# Patient Record
(demographics unavailable — no encounter records)

---

## 2025-03-25 NOTE — HISTORY OF PRESENT ILLNESS
[FreeTextEntry1] : Patient here for establish care. Experiencing thyroid issues. Has persistent neck pain, high fever  [de-identified] : Mr. CHERELLE MONTOYA is a 32 year old male here today for swelling of the thyroid. weight loss, pain in the neck, sweating and increased heart rate for the 2 months  TFTs show high free T4 and low TSH. Has been taking ibuprofen for neck pain.  Had the flu in late January and shortly thereafter he developed pain and swelling in his neck.  Otherwise he is healthy.

## 2025-03-25 NOTE — HEALTH RISK ASSESSMENT
[Poor] : ~his/her~ mood as  poor [No] : No [No falls in past year] : Patient reported no falls in the past year [0] : 2) Feeling down, depressed, or hopeless: Not at all (0) [With Family] : lives with family [Employed] : employed [] :  [Sexually Active] : sexually active [Feels Safe at Home] : Feels safe at home [Current] : Current [NO] : No [Change in mental status noted] : No change in mental status noted [Reports changes in hearing] : Reports no changes in hearing [Reports changes in vision] : Reports no changes in vision [Reports changes in dental health] : Reports no changes in dental health [de-identified] : cigars once a week

## 2025-03-25 NOTE — PHYSICAL EXAM
[No Acute Distress] : no acute distress [Well Nourished] : well nourished [Clear to Auscultation] : lungs were clear to auscultation bilaterally [Regular Rhythm] : with a regular rhythm [No Murmur] : no murmur heard [No Edema] : there was no peripheral edema [Normal] : affect was normal and insight and judgment were intact [de-identified] : thryoid is enlarged and tender to touch [de-identified] : mildly tachrcardic [de-identified] : jennifer

## 2025-03-25 NOTE — REVIEW OF SYSTEMS
[Palpitations] : palpitations [Negative] : Genitourinary [FreeTextEntry2] : weight loss [FreeTextEntry4] : neck pain with thyroid swelling [FreeTextEntry1] : sweating

## 2025-03-27 NOTE — HISTORY OF PRESENT ILLNESS
[FreeTextEntry1] : Mar 26, 2025   accompanied by his wife  PCP:  Dr. Cedrick Pepper  CC:  Hyperthyroid  3/18/2025   TSH 0.019   fT4 3.63 (0.75-2.0)    31 yo, works with [employer],  developed painful swelling in region of R thyroid  lobe starting a few weeks ago.    Treated with antibiotics at [facility].    Dr. Pepper found thyroid enlarged, firm, tender and lab tests show hyperthyroidism.    : : Constitutional:  Alert, well nourished, healthy appearance, no acute distress  Eyes:  No proptosis, no stare Thyroid: Diffusely enlarged, firm ("woody") tender Pulmonary:  No respiratory distress, no accessory muscle use; normal rate and effort Cardiac:  Normal rate Vascular:  Endocrine:  No stigmata of Cushings Syndrome Musculoskeletal:  Normal gait, no involuntary movements Neurology:  No tremors Affect/Mood/Psych:  Oriented x 3; normal affect, normal insight/judgement, normal mood  .  Impression:   Careful evaluation by Dr. Pepper consistent with subacute thyroiditis.  Plan:  US thyroid pending. Updated TFTs at [place] shortly prior to visit here in 3 weeks. Primary symptoms is pain, treated with ibuprofen and Tylenol.  He is interested in prednisone. Will provide 10 mg tabs, starting with BID for 3 days; 15 mg x 3 days; 10 mg x 3 days;   5 mg x 3 days and then discontinue. Typical trajectory and other issues in setting of subacute thyroid itis reviewed.

## 2025-03-27 NOTE — HISTORY OF PRESENT ILLNESS
[FreeTextEntry1] : Mar 26, 2025   accompanied by his wife  PCP:  Dr. Cedrick Pepper  CC:  Hyperthyroid  3/18/2025   TSH 0.019   fT4 3.63 (0.75-2.0)    31 yo, works with [employer],  developed painful swelling in region of R thyroid  lobe starting a few weeks ago.    Treated with antibiotics at [facility].    Dr. Pepper found thyroid enlarged, firm, tender and lab tests show hyperthyroidism.    : : Constitutional:  Alert, well nourished, healthy appearance, no acute distress  Eyes:  No proptosis, no stare Thyroid: Diffusely enlarged, firm ("woody") tender Pulmonary:  No respiratory distress, no accessory muscle use; normal rate and effort Cardiac:  Normal rate Vascular:  Endocrine:  No stigmata of Cushings Syndrome Musculoskeletal:  Normal gait, no involuntary movements Neurology:  No tremors Affect/Mood/Psych:  Oriented x 3; normal affect, normal insight/judgement, normal mood  .  Impression:   Careful evaluation by Dr. Pepper consistent with subacute thyroiditis.  Plan:  US thyroid pending. Updated TFTs at [place] shortly prior to visit here in 3 weeks. Primary symptoms is pain, treated with ibuprofen and Tylenol.  He is interested in prednisone. Will provide 10 mg tabs, starting with BID for 3 days; 15 mg x 3 days; 10 mg x 3 days;   5 mg x 3 days and then discontinue. Typical trajectory and other issues in setting of subacute thyroid itis reviewed.

## 2025-04-25 NOTE — HISTORY OF PRESENT ILLNESS
[FreeTextEntry1] : Apr 22, 2025    in person  PCP:  Dr. Cedrick Pepper  CC:  Hyperthyroid  3/18/2025   TSH 0.019   fT4 3.63 (0.75-2.0)    T3 486 ***                                54/19/25   TSH < 0.15 **  T4 7.4;  T3 117    31 yo, works with [employer],  developed painful swelling in region of R thyroid  lobe starting a few weeks ago.    Treated with antibiotics at [facility].    Dr. Pepper found thyroid enlarged, firm, tender and lab tests show hyperthyroidism.   Based on clinical findings, labs, diagnosed with hyperthyroidism due to subacute thyroiditis and treated with propranolol and steroid taper.  Labs performed a few days ago are considerably improved.    : : Constitutional:  Alert, well nourished, healthy appearance, no acute distress  Eyes:  No proptosis, no stare Thyroid:  enlarged, "woody", only slightly tender Pulmonary:  No respiratory distress, no accessory muscle use; normal rate and effort Cardiac:  Normal rate Vascular:  Endocrine:  No stigmata of Cushings Syndrome Musculoskeletal:  Normal gait, no involuntary movements Neurology:  No tremors Affect/Mood/Psych:  Oriented x 3; normal affect, normal insight/judgement, normal mood  .  Impression:  Hyperthyroid d/t subacute thyroiditis. Severe pain resolved after steroid taper. TFTs improving.  Plan:  Continue regular surveillance. Patient understands trajectory of subacute thyroiditis and need for regular TFTs for now, f/u US thyroid in several months.     Mar 26, 2025   accompanied by his wife  PCP:  Dr. Cedrick Pepper  CC:  Hyperthyroid  3/18/2025   TSH 0.019   fT4 3.63 (0.75-2.0)    31 yo, works with [employer],  developed painful swelling in region of R thyroid  lobe starting a few weeks ago.    Treated with antibiotics at [facility].    Dr. Pepper found thyroid enlarged, firm, tender and lab tests show hyperthyroidism.    : : Constitutional:  Alert, well nourished, healthy appearance, no acute distress  Eyes:  No proptosis, no stare Thyroid: Diffusely enlarged, firm ("woody") tender Pulmonary:  No respiratory distress, no accessory muscle use; normal rate and effort Cardiac:  Normal rate Vascular:  Endocrine:  No stigmata of Cushings Syndrome Musculoskeletal:  Normal gait, no involuntary movements Neurology:  No tremors Affect/Mood/Psych:  Oriented x 3; normal affect, normal insight/judgement, normal mood  .  Impression:   Careful evaluation by Dr. Pepper consistent with subacute thyroiditis.  Plan:  US thyroid pending. Updated TFTs at [place] shortly prior to visit here in 3 weeks. Primary symptoms is pain, treated with ibuprofen and Tylenol.  He is interested in prednisone. Will provide 10 mg tabs, starting with BID for 3 days; 15 mg x 3 days; 10 mg x 3 days;   5 mg x 3 days and then discontinue. Typical trajectory and other issues in setting of subacute thyroid itis reviewed.

## 2025-04-25 NOTE — HISTORY OF PRESENT ILLNESS
[FreeTextEntry1] : Apr 22, 2025    in person  PCP:  Dr. Cedrick Pepper  CC:  Hyperthyroid  3/18/2025   TSH 0.019   fT4 3.63 (0.75-2.0)    T3 486 ***                                54/19/25   TSH < 0.15 **  T4 7.4;  T3 117    33 yo, works with [employer],  developed painful swelling in region of R thyroid  lobe starting a few weeks ago.    Treated with antibiotics at [facility].    Dr. Pepper found thyroid enlarged, firm, tender and lab tests show hyperthyroidism.   Based on clinical findings, labs, diagnosed with hyperthyroidism due to subacute thyroiditis and treated with propranolol and steroid taper.  Labs performed a few days ago are considerably improved.    : : Constitutional:  Alert, well nourished, healthy appearance, no acute distress  Eyes:  No proptosis, no stare Thyroid:  enlarged, "woody", only slightly tender Pulmonary:  No respiratory distress, no accessory muscle use; normal rate and effort Cardiac:  Normal rate Vascular:  Endocrine:  No stigmata of Cushings Syndrome Musculoskeletal:  Normal gait, no involuntary movements Neurology:  No tremors Affect/Mood/Psych:  Oriented x 3; normal affect, normal insight/judgement, normal mood  .  Impression:  Hyperthyroid d/t subacute thyroiditis. Severe pain resolved after steroid taper. TFTs improving.  Plan:  Continue regular surveillance. Patient understands trajectory of subacute thyroiditis and need for regular TFTs for now, f/u US thyroid in several months.     Mar 26, 2025   accompanied by his wife  PCP:  Dr. Cedrick Pepper  CC:  Hyperthyroid  3/18/2025   TSH 0.019   fT4 3.63 (0.75-2.0)    33 yo, works with [employer],  developed painful swelling in region of R thyroid  lobe starting a few weeks ago.    Treated with antibiotics at [facility].    Dr. Pepper found thyroid enlarged, firm, tender and lab tests show hyperthyroidism.    : : Constitutional:  Alert, well nourished, healthy appearance, no acute distress  Eyes:  No proptosis, no stare Thyroid: Diffusely enlarged, firm ("woody") tender Pulmonary:  No respiratory distress, no accessory muscle use; normal rate and effort Cardiac:  Normal rate Vascular:  Endocrine:  No stigmata of Cushings Syndrome Musculoskeletal:  Normal gait, no involuntary movements Neurology:  No tremors Affect/Mood/Psych:  Oriented x 3; normal affect, normal insight/judgement, normal mood  .  Impression:   Careful evaluation by Dr. Pepper consistent with subacute thyroiditis.  Plan:  US thyroid pending. Updated TFTs at [place] shortly prior to visit here in 3 weeks. Primary symptoms is pain, treated with ibuprofen and Tylenol.  He is interested in prednisone. Will provide 10 mg tabs, starting with BID for 3 days; 15 mg x 3 days; 10 mg x 3 days;   5 mg x 3 days and then discontinue. Typical trajectory and other issues in setting of subacute thyroid itis reviewed.